# Patient Record
Sex: MALE | Race: WHITE | NOT HISPANIC OR LATINO | Employment: OTHER | URBAN - NONMETROPOLITAN AREA
[De-identification: names, ages, dates, MRNs, and addresses within clinical notes are randomized per-mention and may not be internally consistent; named-entity substitution may affect disease eponyms.]

---

## 2018-09-24 ENCOUNTER — OFFICE VISIT (OUTPATIENT)
Dept: URGENT CARE | Facility: PHYSICIAN GROUP | Age: 63
End: 2018-09-24
Payer: COMMERCIAL

## 2018-09-24 VITALS
DIASTOLIC BLOOD PRESSURE: 96 MMHG | OXYGEN SATURATION: 94 % | HEIGHT: 71 IN | WEIGHT: 172 LBS | HEART RATE: 70 BPM | BODY MASS INDEX: 24.08 KG/M2 | RESPIRATION RATE: 14 BRPM | SYSTOLIC BLOOD PRESSURE: 130 MMHG | TEMPERATURE: 98.3 F

## 2018-09-24 DIAGNOSIS — R05.9 COUGH: ICD-10-CM

## 2018-09-24 DIAGNOSIS — R68.89 FLU-LIKE SYMPTOMS: ICD-10-CM

## 2018-09-24 DIAGNOSIS — J22 ACUTE RESPIRATORY INFECTION: ICD-10-CM

## 2018-09-24 LAB
FLUAV+FLUBV AG SPEC QL IA: NEGATIVE
INT CON NEG: NORMAL
INT CON POS: NORMAL

## 2018-09-24 PROCEDURE — 87804 INFLUENZA ASSAY W/OPTIC: CPT | Performed by: FAMILY MEDICINE

## 2018-09-24 PROCEDURE — 99203 OFFICE O/P NEW LOW 30 MIN: CPT | Performed by: FAMILY MEDICINE

## 2018-09-24 RX ORDER — AZITHROMYCIN 250 MG/1
TABLET, FILM COATED ORAL
Qty: 6 TAB | Refills: 0 | Status: SHIPPED | OUTPATIENT
Start: 2018-09-24

## 2018-09-24 RX ORDER — CODEINE PHOSPHATE AND GUAIFENESIN 10; 100 MG/5ML; MG/5ML
SOLUTION ORAL
Qty: 70 ML | Refills: 0 | Status: SHIPPED | OUTPATIENT
Start: 2018-09-24 | End: 2018-10-02

## 2018-09-24 NOTE — PROGRESS NOTES
Chief Complaint:    Chief Complaint   Patient presents with   • Cough     x3d        History of Present Illness:    This is a new problem. For 3 days, he has intermittent coughing, but the coughing can be severe at night, interfering with sleep. He is not coughing up purulent mucus. He always has some nasal symptoms, past few days, he is having more nasal symptoms than usual. No purulent mucus from nose. No fever. Tried OTC meds for symptoms without help. Does not like antihistamines - dries him out too much. He reports likely having influenza last year (was not seen in clinic) and he was very sick. Concerned he may be getting flu with current symptoms.      Review of Systems:    Constitutional: Negative for fever, chills, and diaphoresis.   Eyes: Negative for change in vision, photophobia, pain, redness, and discharge.  ENT: See HPI.  Respiratory: See HPI.  Cardiovascular: Negative for chest pain, palpitations, orthopnea, claudication, leg swelling, and PND.   Gastrointestinal: Negative for abdominal pain, nausea, vomiting, diarrhea, constipation, blood in stool, and melena.   Musculoskeletal: Negative for myalgias, joint pain, neck pain, and back pain.   Skin: Negative for rash and itching.   Neurological: Negative for dizziness, tingling, tremors, sensory change, speech change, focal weakness, seizures, loss of consciousness, and headaches.   Psychiatric/Behavioral: Negative for depression, suicidal ideas, hallucinations, memory loss and substance abuse. The patient is not nervous/anxious and does not have insomnia.        Past Medical History:    History reviewed. No pertinent past medical history.    Past Surgical History:    History reviewed. No pertinent surgical history.    Social History:    Social History     Social History   • Marital status: Single     Spouse name: N/A   • Number of children: N/A   • Years of education: N/A     Occupational History   • Not on file.     Social History Main Topics   •  "Smoking status: Not on file   • Smokeless tobacco: Not on file   • Alcohol use Not on file   • Drug use: Unknown   • Sexual activity: Not on file     Other Topics Concern   • Not on file     Social History Narrative   • No narrative on file     Family History:    History reviewed. No pertinent family history.    Medications:    No current outpatient prescriptions on file prior to visit.     No current facility-administered medications on file prior to visit.      Allergies:    No Known Allergies      Vitals:    Vitals:    09/24/18 1055   BP: 130/96   BP Location: Left arm   Patient Position: Sitting   BP Cuff Size: Adult   Pulse: 70   Resp: 14   Temp: 36.8 °C (98.3 °F)   TempSrc: Temporal   SpO2: 94%   Weight: 78 kg (172 lb)   Height: 1.803 m (5' 11\")       Physical Exam:    Constitutional: Vital signs reviewed. Appears well-developed and well-nourished. Occl cough. No acute distress.   ENT: Bilateral nasal congestion and erythematous nasal mucosa, R>L. External ears normal. External auditory canals normal without discharge. TMs translucent and non-bulging. Hearing normal. Lips/teeth are normal. Oral mucosa pink and moist. Posterior pharynx: WNL.  Cardiovascular: Regular rate and rhythm. No murmur.  Pulmonary/Chest: Respirations non-labored. Clear to auscultation bilaterally.  Lymph: Cervical nodes without tenderness or enlargement.  Musculoskeletal: Normal gait. Normal range of motion. No muscular atrophy or weakness.  Neurological: Alert and oriented to person, place, and time. Muscle tone normal. Coordination normal.   Skin: No rashes or lesions. Warm, dry, normal turgor.    Diagnostics:    POCT INFLUENZA A/B (Order #[id number]) on 9/24/18   Component Results     Component   Rapid Influenza A-B   Negative    Internal Control Positive   Valid    Internal Control Negative   Valid    Last Resulted Time   Mon Sep 24, 2018 11:58 AM       Assessment / Plan:    1. Flu-like symptoms  - POCT Influenza A/B    2. Acute " respiratory infection  - azithromycin (ZITHROMAX) 250 MG Tab; 2 TABS ON DAY 1, 1 TAB ON DAYS 2-5.  Dispense: 6 Tab; Refill: 0    3. Cough  - guaifenesin-codeine (ROBITUSSIN AC) Solution oral solution; 5-10 ML (1-2 TEASPOONS) AT NIGHT ONLY IF NEEDED FOR COUGH FOR UP TO 7 DAYS. MAY CAUSE DROWSINESS.  Dispense: 70 mL; Refill: 0      Discussed with him DDX, management options, and risks, benefits, and alternatives to treatment plan agreed upon.    Recommended to further observe, see if symptoms will self-resolve, and treat symptoms prn.    Agreeable to cough medication prescribed to use prn.  report checked - no Rx x 3 years.    Back-up antibiotic prescription provided with advice to fill and take only if getting much worse or not improving at all after ~7-14 days of symptoms.    Discussed expected course of duration, time for improvement, and to seek follow-up in Emergency Room, urgent care, or with PCP if getting worse at any time or not improving within expected time frame.

## 2023-09-20 ENCOUNTER — OFFICE VISIT (OUTPATIENT)
Dept: URGENT CARE | Facility: PHYSICIAN GROUP | Age: 68
End: 2023-09-20
Payer: COMMERCIAL

## 2023-09-20 VITALS
DIASTOLIC BLOOD PRESSURE: 90 MMHG | SYSTOLIC BLOOD PRESSURE: 120 MMHG | BODY MASS INDEX: 26.78 KG/M2 | TEMPERATURE: 97 F | HEART RATE: 88 BPM | RESPIRATION RATE: 12 BRPM | WEIGHT: 192 LBS | OXYGEN SATURATION: 96 %

## 2023-09-20 DIAGNOSIS — J06.9 VIRAL URI WITH COUGH: ICD-10-CM

## 2023-09-20 DIAGNOSIS — R68.89 FLU-LIKE SYMPTOMS: ICD-10-CM

## 2023-09-20 LAB
FLUAV RNA SPEC QL NAA+PROBE: NEGATIVE
FLUBV RNA SPEC QL NAA+PROBE: NEGATIVE
RSV RNA SPEC QL NAA+PROBE: NEGATIVE
SARS-COV-2 RNA RESP QL NAA+PROBE: NEGATIVE

## 2023-09-20 PROCEDURE — 99203 OFFICE O/P NEW LOW 30 MIN: CPT | Performed by: NURSE PRACTITIONER

## 2023-09-20 PROCEDURE — 0241U POCT CEPHEID COV-2, FLU A/B, RSV - PCR: CPT | Performed by: NURSE PRACTITIONER

## 2023-09-20 PROCEDURE — 3080F DIAST BP >= 90 MM HG: CPT | Performed by: NURSE PRACTITIONER

## 2023-09-20 PROCEDURE — 3074F SYST BP LT 130 MM HG: CPT | Performed by: NURSE PRACTITIONER

## 2023-09-20 RX ORDER — AMOXICILLIN 500 MG/1
TABLET, FILM COATED ORAL
COMMUNITY
Start: 2023-08-09

## 2023-09-20 RX ORDER — NAPROXEN 500 MG/1
500 TABLET ORAL EVERY 12 HOURS PRN
COMMUNITY
Start: 2023-08-09

## 2023-09-20 ASSESSMENT — ENCOUNTER SYMPTOMS
SINUS PAIN: 0
COUGH: 1
MYALGIAS: 0
WHEEZING: 0
CONSTIPATION: 0
EYE DISCHARGE: 0
NAUSEA: 0
WEAKNESS: 0
VOMITING: 0
SORE THROAT: 0
CARDIOVASCULAR NEGATIVE: 1
FEVER: 0
DIZZINESS: 0
ABDOMINAL PAIN: 0
EYE REDNESS: 0
CHILLS: 0
DIARRHEA: 0
SPUTUM PRODUCTION: 0
SHORTNESS OF BREATH: 1
HEADACHES: 0

## 2023-09-20 NOTE — PROGRESS NOTES
Subjective     Girish Trejo III is a 68 y.o. male who presents with Coronavirus Screening (X2days )            HPI  States has been experiencing cold-like symptoms x2 days.   does have nasal congestion, runny nose, dry hacking cough. Post nasal drainage.   has been using Rosario selter cold/cough 1x. History of seasonal allergies. Does not take allergy med for this. Unable to take nasal sprays and saline rinses as this makes symptoms worse.  States his father has similar symptoms x4 days.  Has not been tested for COVID/flu.  Denies any other chronic illnesses or takes any prescribed medications at this time.     PMH:  has no past medical history on file.  MEDS:   Current Outpatient Medications:     Amoxicillin 500 MG Tab, TAKE 1 TABLET BY MOUTH 3 TIMES A DAY UNTIL GONE (Patient not taking: Reported on 9/20/2023), Disp: , Rfl:     naproxen (NAPROSYN) 500 MG Tab, Take 500 mg by mouth every 12 hours as needed. as needed for pain (Patient not taking: Reported on 9/20/2023), Disp: , Rfl:     azithromycin (ZITHROMAX) 250 MG Tab, 2 TABS ON DAY 1, 1 TAB ON DAYS 2-5. (Patient not taking: Reported on 9/20/2023), Disp: 6 Tab, Rfl: 0  ALLERGIES: No Known Allergies  SURGHX: History reviewed. No pertinent surgical history.  SOCHX:    FH: Family history was reviewed, no pertinent findings to report    Review of Systems   Constitutional:  Positive for malaise/fatigue. Negative for chills and fever.   HENT:  Positive for congestion. Negative for ear pain, sinus pain and sore throat.    Eyes:  Negative for discharge and redness.   Respiratory:  Positive for cough and shortness of breath. Negative for sputum production and wheezing.    Cardiovascular: Negative.    Gastrointestinal:  Negative for abdominal pain, constipation, diarrhea, nausea and vomiting.   Musculoskeletal:  Negative for myalgias.   Skin:  Negative for itching and rash.   Neurological:  Negative for dizziness, weakness and headaches.   Endo/Heme/Allergies:   Positive for environmental allergies.   All other systems reviewed and are negative.             Objective     BP (!) 120/90   Pulse 88   Temp 36.1 °C (97 °F) (Temporal)   Resp 12   Wt 87.1 kg (192 lb)   SpO2 96%   BMI 26.78 kg/m²      Physical Exam  Vitals reviewed.   Constitutional:       General: He is awake. He is not in acute distress.     Appearance: Normal appearance. He is well-developed. He is not ill-appearing, toxic-appearing or diaphoretic.   HENT:      Head: Normocephalic.      Right Ear: Tympanic membrane, ear canal and external ear normal.      Left Ear: Tympanic membrane, ear canal and external ear normal.      Nose: Congestion present. No rhinorrhea.      Mouth/Throat:      Lips: Pink.      Mouth: Mucous membranes are dry.      Pharynx: Oropharynx is clear. Uvula midline.   Eyes:      Conjunctiva/sclera: Conjunctivae normal.      Pupils: Pupils are equal, round, and reactive to light.   Cardiovascular:      Rate and Rhythm: Normal rate.   Pulmonary:      Effort: Pulmonary effort is normal.      Breath sounds: Normal breath sounds and air entry.      Comments: Dry hacking cough heard on exam  Musculoskeletal:         General: Normal range of motion.      Cervical back: Normal range of motion and neck supple.   Skin:     General: Skin is warm and dry.   Neurological:      Mental Status: He is alert and oriented to person, place, and time.   Psychiatric:         Attention and Perception: Attention normal.         Mood and Affect: Mood normal.         Speech: Speech normal.         Behavior: Behavior normal. Behavior is cooperative.                             Assessment & Plan        1. Flu-like symptoms    - POCT Cepheid CoV-2, Flu A/B, RSV - PCR    2. Viral URI with cough        -Maintain hydration/water intake  -May use over the counter Ibuprofen/Tylenol as needed for any fever, headcahe, body aches  -May use humidifier/vaporizer at home as needed for dry cough/nasal passages  -Gargle salt  water or throat lozenges as needed for throat irritation/dry cough  -Get rest  -May use daily longer acting antihistamine as needed (no decongestant) for any post nasal drainage   -May use saline nasal spray/Flonase as needed to flush any nasal congestion/post nasal drainage, if tolerated  -Monitor for fevers, worse cough, phlegm, shortness of breath, wheeze, chest tightness- need re-evaluation in urgent care

## 2025-03-27 ENCOUNTER — OFFICE VISIT (OUTPATIENT)
Dept: URGENT CARE | Facility: PHYSICIAN GROUP | Age: 70
End: 2025-03-27
Payer: COMMERCIAL

## 2025-03-27 VITALS
BODY MASS INDEX: 23.38 KG/M2 | DIASTOLIC BLOOD PRESSURE: 78 MMHG | HEART RATE: 78 BPM | RESPIRATION RATE: 18 BRPM | SYSTOLIC BLOOD PRESSURE: 122 MMHG | OXYGEN SATURATION: 98 % | HEIGHT: 71 IN | TEMPERATURE: 98.1 F | WEIGHT: 167 LBS

## 2025-03-27 DIAGNOSIS — S93.401A SPRAIN OF RIGHT ANKLE, UNSPECIFIED LIGAMENT, INITIAL ENCOUNTER: ICD-10-CM

## 2025-03-27 DIAGNOSIS — L03.115 CELLULITIS OF RIGHT FOOT: ICD-10-CM

## 2025-03-27 PROCEDURE — 99213 OFFICE O/P EST LOW 20 MIN: CPT | Performed by: NURSE PRACTITIONER

## 2025-03-27 PROCEDURE — 3078F DIAST BP <80 MM HG: CPT | Performed by: NURSE PRACTITIONER

## 2025-03-27 PROCEDURE — 3074F SYST BP LT 130 MM HG: CPT | Performed by: NURSE PRACTITIONER

## 2025-03-27 RX ORDER — CEPHALEXIN 500 MG/1
500 CAPSULE ORAL 4 TIMES DAILY
Qty: 20 CAPSULE | Refills: 0 | Status: SHIPPED | OUTPATIENT
Start: 2025-03-27 | End: 2025-04-01

## 2025-03-27 ASSESSMENT — VISUAL ACUITY: OU: 1

## 2025-03-27 NOTE — PROGRESS NOTES
Subjective:     Girish Trejo III is a 69 y.o. male who presents for Ankle Pain ((L) x 2 days ago, pt states he twisted his ankle and fell)       Ankle Pain   The injury mechanism was an inversion injury. The pain is present in the right ankle.     Patient reports on Sunday, he was helping to move some furniture.  This was not the cause of his right ankle injury.  Reports as he was walking outside, he inadvertently inverted his right ankle.  Attributes this to his overall generalized fatigue which has been chronic ongoing.    Comes in today with concerns of persistent swelling as well as new onset of bruising spanning from his ankle to his foot.  Has had baseline numbness of his toes for several years.  Reports redness and warmth.  He is specifically concerned of infection.  He doubts fracture.  Walking with a limp.  Using ibuprofen/aspirin as needed.    Reports he is currently in between insurance coverage.    Review of Systems   Musculoskeletal:         Per HPI   All other systems reviewed and are negative.    Refer to HPI for additional details.    During this visit, appropriate PPE was worn, and hand hygiene was performed.    PMH:  has no past medical history on file.    MEDS:   Current Outpatient Medications:     cephALEXin (KEFLEX) 500 MG Cap, Take 1 Capsule by mouth 4 times a day for 5 days., Disp: 20 Capsule, Rfl: 0    Amoxicillin 500 MG Tab, TAKE 1 TABLET BY MOUTH 3 TIMES A DAY UNTIL GONE (Patient not taking: Reported on 3/27/2025), Disp: , Rfl:     naproxen (NAPROSYN) 500 MG Tab, Take 500 mg by mouth every 12 hours as needed. as needed for pain (Patient not taking: Reported on 3/27/2025), Disp: , Rfl:     azithromycin (ZITHROMAX) 250 MG Tab, 2 TABS ON DAY 1, 1 TAB ON DAYS 2-5. (Patient not taking: Reported on 3/27/2025), Disp: 6 Tab, Rfl: 0    ALLERGIES: No Known Allergies  SURGHX: History reviewed. No pertinent surgical history.  SOCHX:      FH: Per HPI as applicable/pertinent.      Objective:     /78  "  Pulse 78   Temp 36.7 °C (98.1 °F) (Temporal)   Resp 18   Ht 1.803 m (5' 11\")   Wt 75.8 kg (167 lb)   SpO2 98%   BMI 23.29 kg/m²     Physical Exam  Nursing note reviewed.   Constitutional:       General: He is not in acute distress.     Appearance: He is well-developed. He is not ill-appearing or toxic-appearing.   Eyes:      General: Vision grossly intact.   Cardiovascular:      Rate and Rhythm: Normal rate.      Pulses: Normal pulses.   Pulmonary:      Effort: Pulmonary effort is normal. No respiratory distress.   Musculoskeletal:         General: No deformity. Normal range of motion.      Right ankle: Swelling (With erythema) and ecchymosis (Bilaterally spanning to heel and toes) present. No deformity or lacerations. Tenderness present over the lateral malleolus and ATF ligament. Normal range of motion. Anterior drawer test negative. Normal pulse.      Right Achilles Tendon: Normal. Warner's test negative.      Right foot: Normal range of motion and normal capillary refill (Distal NVI). Swelling (With erythema and increased warmth) present. No deformity, laceration or tenderness. Normal pulse.   Skin:     General: Skin is warm and dry.      Capillary Refill: Capillary refill takes less than 2 seconds.      Coloration: Skin is not pale.      Findings: Bruising and erythema present. No rash.   Neurological:      Mental Status: He is alert and oriented to person, place, and time.      Motor: No weakness.      Gait: Gait abnormal (Antalgic).   Psychiatric:         Behavior: Behavior normal. Behavior is cooperative.       Assessment/Plan:     1. Sprain of right ankle, unspecified ligament, initial encounter    2. Cellulitis of right foot  - cephALEXin (KEFLEX) 500 MG Cap; Take 1 Capsule by mouth 4 times a day for 5 days.  Dispense: 20 Capsule; Refill: 0    Probable sprain.  However, I did recommended x-ray to further evaluate right ankle.  However, patient declines at this time.  He continues to doubt " concern for fracture and reiterates that he is in between insurance coverage.  He is mostly concerned about potential infection.  He is unaware of medical issues such as diabetes or decreased circulation to his right lower extremity.  However, he does state that he has had chronic tingling of the right toes.  Did discuss that decreased circulation could increase risk of infection.  No visible wounds.  However, patient would be more comfortable with initiation of antibiotic empirically for possible associated cellulitis.  Advised to continue supportive care for sprain.  Recommended ice, compression, and elevation as well as continuing over-the-counter NSAIDs as needed.  I recommended splint for ankle.  However, patient declines at this time.  Advised to monitor symptoms.  Warning signs reviewed.  Immediate return precautions advised.    Differential diagnosis, natural history, supportive care, over-the-counter symptom management per 's instructions, close monitoring, and indications for immediate follow-up discussed.     All questions answered. Patient agrees with the plan of care.